# Patient Record
Sex: MALE | Race: WHITE | URBAN - METROPOLITAN AREA
[De-identification: names, ages, dates, MRNs, and addresses within clinical notes are randomized per-mention and may not be internally consistent; named-entity substitution may affect disease eponyms.]

---

## 2021-03-19 ENCOUNTER — OFFICE VISIT (OUTPATIENT)
Dept: URBAN - METROPOLITAN AREA CLINIC 29 | Facility: CLINIC | Age: 31
End: 2021-03-19
Payer: COMMERCIAL

## 2021-03-19 PROCEDURE — 92004 COMPRE OPH EXAM NEW PT 1/>: CPT | Performed by: OPTOMETRIST

## 2021-03-19 PROCEDURE — 92134 CPTRZ OPH DX IMG PST SGM RTA: CPT | Performed by: OPTOMETRIST

## 2021-03-19 ASSESSMENT — INTRAOCULAR PRESSURE
OD: 15
OS: 22

## 2021-03-19 NOTE — IMPRESSION/PLAN
Impression: Central retinal vein occls, right eye, with macular edema: H34.8110. Plan: Discussed diagnosis in detail with patient. Discussed treatment options with patient. Consult recommended [[facility] Discussed risks of progression. Call if [address] worsens. Advised patient to follow up with PCP for HTN treatment.

## 2021-03-19 NOTE — ASSESSMENT/PLAN
Impression: OCT MAC - OD: Good-; OS: Good- Plan: Macular edema OD. Normal macula appearance without signs of disease OS.

## 2021-03-22 ENCOUNTER — OFFICE VISIT (OUTPATIENT)
Dept: URBAN - METROPOLITAN AREA CLINIC 13 | Facility: CLINIC | Age: 31
End: 2021-03-22
Payer: COMMERCIAL

## 2021-03-22 PROCEDURE — 99204 OFFICE O/P NEW MOD 45 MIN: CPT | Performed by: OPHTHALMOLOGY

## 2021-03-22 PROCEDURE — 92134 CPTRZ OPH DX IMG PST SGM RTA: CPT | Performed by: OPHTHALMOLOGY

## 2021-03-22 ASSESSMENT — INTRAOCULAR PRESSURE
OS: 15
OD: 12

## 2021-03-22 NOTE — IMPRESSION/PLAN
Impression: Central retinal vein occlusion of right eye w/ macular edema: H34.8110. OCT OU = IRF OD, no SRF/IRF OS  / 29 Plan: 32year old - had high BP when seen in ED on 3/17 -- now on better control The patient has a central retinal vein occlusion (CRVO) with significant macular edema. There is no iris or angle neovascularization identified on examination. We discussed the natural history and the importance of a cardiovascular evaluation with a primary care physician, including optimizing blood pressure. We discussed treatment options, including intravitreal steroid injection, anti-VEGF injection, or participation in one of our ongoing clinical trials. 

1 week Optos FA/Avastin OD  - if not clinical trial participant

## 2021-03-29 ENCOUNTER — OFFICE VISIT (OUTPATIENT)
Dept: URBAN - METROPOLITAN AREA CLINIC 13 | Facility: CLINIC | Age: 31
End: 2021-03-29
Payer: COMMERCIAL

## 2021-03-29 PROCEDURE — 92235 FLUORESCEIN ANGRPH MLTIFRAME: CPT | Performed by: OPHTHALMOLOGY

## 2021-03-29 ASSESSMENT — INTRAOCULAR PRESSURE
OS: 19
OD: 18

## 2021-04-09 ENCOUNTER — Encounter (OUTPATIENT)
Dept: URBAN - METROPOLITAN AREA CLINIC 13 | Facility: CLINIC | Age: 31
End: 2021-04-09
Payer: COMMERCIAL

## 2021-04-09 PROCEDURE — 67028 INJECTION EYE DRUG: CPT | Performed by: OPHTHALMOLOGY

## 2021-04-09 ASSESSMENT — INTRAOCULAR PRESSURE
OS: 16
OD: 15
OD: 15
OS: 16

## 2021-05-10 ENCOUNTER — OFFICE VISIT (OUTPATIENT)
Dept: URBAN - METROPOLITAN AREA CLINIC 13 | Facility: CLINIC | Age: 31
End: 2021-05-10
Payer: COMMERCIAL

## 2021-05-10 PROCEDURE — 67028 INJECTION EYE DRUG: CPT | Performed by: OPHTHALMOLOGY

## 2021-05-10 PROCEDURE — 99214 OFFICE O/P EST MOD 30 MIN: CPT | Performed by: OPHTHALMOLOGY

## 2021-05-10 PROCEDURE — 92134 CPTRZ OPH DX IMG PST SGM RTA: CPT | Performed by: OPHTHALMOLOGY

## 2021-05-10 ASSESSMENT — INTRAOCULAR PRESSURE
OD: 20
OS: 19

## 2021-05-10 NOTE — IMPRESSION/PLAN
Impression: Central retinal vein occlusion of right eye w/ macular edema: H34.8110. OCT OU = IRF OD, no SRF/IRF OS  / 282
s/p Avastin OD - 04/09/2021 Plan: Improved post Avastin. Still has edema. Rec Avastin as monthly series. Discussed R,B,A of Avastin vs Lucentis vs Eylea vs Beovu injection. Discussed no FDA approval with Avastin and compounding risk. Discussed signs and symptoms of inflammation, endophthalmitis, vitreous hemorrhage, retinal tear, retinal detachment. Patient understands and wishes to proceed with Avastin injection today. Timeout was performed before procedure. AVASTIN INJECTION COMPLETED TODAY as per protocol without complications. 

1 month OCT OU Avastin OD 2/3

## 2021-06-07 ENCOUNTER — PROCEDURE (OUTPATIENT)
Dept: URBAN - METROPOLITAN AREA CLINIC 13 | Facility: CLINIC | Age: 31
End: 2021-06-07
Payer: COMMERCIAL

## 2021-06-07 PROCEDURE — 67028 INJECTION EYE DRUG: CPT | Performed by: OPHTHALMOLOGY

## 2021-06-07 PROCEDURE — 92134 CPTRZ OPH DX IMG PST SGM RTA: CPT | Performed by: OPHTHALMOLOGY

## 2021-06-07 ASSESSMENT — INTRAOCULAR PRESSURE
OD: 14
OS: 14

## 2021-07-12 ENCOUNTER — PROCEDURE (OUTPATIENT)
Dept: URBAN - METROPOLITAN AREA CLINIC 7 | Facility: CLINIC | Age: 31
End: 2021-07-12
Payer: COMMERCIAL

## 2021-07-12 PROCEDURE — 92134 CPTRZ OPH DX IMG PST SGM RTA: CPT | Performed by: OPHTHALMOLOGY

## 2021-07-12 PROCEDURE — 67028 INJECTION EYE DRUG: CPT | Performed by: OPHTHALMOLOGY

## 2021-07-12 ASSESSMENT — INTRAOCULAR PRESSURE
OD: 17
OS: 20

## 2021-08-23 ENCOUNTER — OFFICE VISIT (OUTPATIENT)
Dept: URBAN - METROPOLITAN AREA CLINIC 7 | Facility: CLINIC | Age: 31
End: 2021-08-23
Payer: COMMERCIAL

## 2021-08-23 DIAGNOSIS — H34.8110 CENTRAL RETINAL VEIN OCCLUSION OF RIGHT EYE W/ MACULAR EDEMA: Primary | ICD-10-CM

## 2021-08-23 PROCEDURE — 99214 OFFICE O/P EST MOD 30 MIN: CPT | Performed by: OPHTHALMOLOGY

## 2021-08-23 PROCEDURE — 67028 INJECTION EYE DRUG: CPT | Performed by: OPHTHALMOLOGY

## 2021-08-23 PROCEDURE — 92134 CPTRZ OPH DX IMG PST SGM RTA: CPT | Performed by: OPHTHALMOLOGY

## 2021-08-23 ASSESSMENT — INTRAOCULAR PRESSURE
OS: 16
OD: 17

## 2021-08-23 NOTE — IMPRESSION/PLAN
Impression: Central retinal vein occlusion of right eye w/ macular edema: H34.8110. OCT OU = increased IRF OD, no SRF/IRF OS  / 273
s/p Avastin OD - 07/12/2021 Plan: Improved post Avastin at 4 weeks, a bit worse at 6 weeks. Rec Avastin as monthly series at 4-5 weeks. Discussed R,B,A of Avastin vs Lucentis vs Eylea vs Beovu injection. Discussed no FDA approval with Avastin and compounding risk. Discussed signs and symptoms of inflammation, endophthalmitis, vitreous hemorrhage, retinal tear, retinal detachment. Patient understands and wishes to proceed with Avastin injection today. Timeout was performed before procedure. AVASTIN INJECTION COMPLETED TODAY as per protocol without complications. 

4-5wks OCT OU Avastin OD 2/3

## 2021-09-20 ENCOUNTER — PROCEDURE (OUTPATIENT)
Dept: URBAN - METROPOLITAN AREA CLINIC 7 | Facility: CLINIC | Age: 31
End: 2021-09-20
Payer: COMMERCIAL

## 2021-09-20 PROCEDURE — 92134 CPTRZ OPH DX IMG PST SGM RTA: CPT | Performed by: OPHTHALMOLOGY

## 2021-09-20 PROCEDURE — 67028 INJECTION EYE DRUG: CPT | Performed by: OPHTHALMOLOGY

## 2021-09-20 ASSESSMENT — INTRAOCULAR PRESSURE
OD: 14
OS: 12

## 2021-10-18 ENCOUNTER — PROCEDURE (OUTPATIENT)
Dept: URBAN - METROPOLITAN AREA CLINIC 7 | Facility: CLINIC | Age: 31
End: 2021-10-18
Payer: COMMERCIAL

## 2021-10-18 PROCEDURE — 92134 CPTRZ OPH DX IMG PST SGM RTA: CPT | Performed by: OPHTHALMOLOGY

## 2021-10-18 PROCEDURE — 67028 INJECTION EYE DRUG: CPT | Performed by: OPHTHALMOLOGY

## 2021-10-18 ASSESSMENT — INTRAOCULAR PRESSURE
OS: 17
OD: 17

## 2021-11-16 ENCOUNTER — OFFICE VISIT (OUTPATIENT)
Dept: URBAN - METROPOLITAN AREA CLINIC 13 | Facility: CLINIC | Age: 31
End: 2021-11-16
Payer: COMMERCIAL

## 2021-11-16 PROCEDURE — 92134 CPTRZ OPH DX IMG PST SGM RTA: CPT | Performed by: OPHTHALMOLOGY

## 2021-11-16 PROCEDURE — 99214 OFFICE O/P EST MOD 30 MIN: CPT | Performed by: OPHTHALMOLOGY

## 2021-11-16 PROCEDURE — 67028 INJECTION EYE DRUG: CPT | Performed by: OPHTHALMOLOGY

## 2021-11-16 ASSESSMENT — INTRAOCULAR PRESSURE
OD: 19
OS: 18

## 2021-11-16 NOTE — IMPRESSION/PLAN
Impression: Central retinal vein occlusion of right eye w/ macular edema: H34.8110. OCT OU = mild IRF OD, no SRF/IRF OS  / 283 
s/p Avastin OD - 10/18/2021 Plan: Improved post Avastin at 4 weeks, a bit worse at 6 weeks. Rec Avastin today. May consider switch to other anti-VEGF in hopes of longer duration of drying. Would hold off on steroid for now to avoid cataract formation, given his young age. D/w patient, who agrees = switch to [facility] at next visit. Discussed R,B,A of Avastin vs Lucentis vs Eylea vs Beovu injection. Discussed no FDA approval with Avastin and compounding risk. Discussed signs and symptoms of inflammation, endophthalmitis, vitreous hemorrhage, retinal tear, retinal detachment. Patient understands and wishes to proceed with Avastin injection today. Timeout was performed before procedure. AVASTIN INJECTION COMPLETED TODAY as per protocol without complications. 

4-5wks OCT OU Eylea OD 2/3

## 2021-12-20 ENCOUNTER — OFFICE VISIT (OUTPATIENT)
Dept: URBAN - METROPOLITAN AREA CLINIC 13 | Facility: CLINIC | Age: 31
End: 2021-12-20
Payer: COMMERCIAL

## 2021-12-20 PROCEDURE — 92134 CPTRZ OPH DX IMG PST SGM RTA: CPT | Performed by: OPHTHALMOLOGY

## 2021-12-20 PROCEDURE — 67028 INJECTION EYE DRUG: CPT | Performed by: OPHTHALMOLOGY

## 2021-12-20 ASSESSMENT — INTRAOCULAR PRESSURE
OS: 18
OD: 18

## 2022-01-24 ENCOUNTER — PROCEDURE (OUTPATIENT)
Dept: URBAN - METROPOLITAN AREA CLINIC 7 | Facility: CLINIC | Age: 32
End: 2022-01-24
Payer: COMMERCIAL

## 2022-01-24 PROCEDURE — 67028 INJECTION EYE DRUG: CPT | Performed by: OPHTHALMOLOGY

## 2022-01-24 PROCEDURE — 92134 CPTRZ OPH DX IMG PST SGM RTA: CPT | Performed by: OPHTHALMOLOGY

## 2022-03-07 ENCOUNTER — OFFICE VISIT (OUTPATIENT)
Dept: URBAN - METROPOLITAN AREA CLINIC 7 | Facility: CLINIC | Age: 32
End: 2022-03-07
Payer: COMMERCIAL

## 2022-03-07 PROCEDURE — 92134 CPTRZ OPH DX IMG PST SGM RTA: CPT | Performed by: OPHTHALMOLOGY

## 2022-03-07 PROCEDURE — 99214 OFFICE O/P EST MOD 30 MIN: CPT | Performed by: OPHTHALMOLOGY

## 2022-03-07 PROCEDURE — 67028 INJECTION EYE DRUG: CPT | Performed by: OPHTHALMOLOGY

## 2022-03-07 ASSESSMENT — INTRAOCULAR PRESSURE
OD: 16
OS: 17

## 2022-03-07 NOTE — IMPRESSION/PLAN
Impression: Central retinal vein occlusion of right eye w/ macular edema: H34.8110. OCT OU = mild IRF OD, no SRF/IRF OS  / 281
s/p Avastin OD - 10/18/2021
s/p EYL OD 1/24/22 Plan: Improved post Avastin at 4 weeks, a bit worse at 6 weeks. Today, stable 6 weeks post Eylea. Rec T&E Discussed R,B,A of Avastin vs Lucentis vs Eylea vs Beovu injection. Discussed no FDA approval with Avastin and compounding risk. Discussed signs and symptoms of inflammation, endophthalmitis, vitreous hemorrhage, retinal tear, retinal detachment. Patient understands and wishes to proceed with Avastin injection today. Timeout was performed before procedure. AVASTIN INJECTION COMPLETED TODAY as per protocol without complications. 

7-8wks OCT OU re-eval Eylea OD

## 2022-05-10 ENCOUNTER — OFFICE VISIT (OUTPATIENT)
Dept: URBAN - METROPOLITAN AREA CLINIC 13 | Facility: CLINIC | Age: 32
End: 2022-05-10
Payer: COMMERCIAL

## 2022-05-10 DIAGNOSIS — H34.8110 CENTRAL RETINAL VEIN OCCLUSION, RIGHT EYE, WITH MACULAR EDEMA: Primary | ICD-10-CM

## 2022-05-10 PROCEDURE — 92134 CPTRZ OPH DX IMG PST SGM RTA: CPT | Performed by: OPHTHALMOLOGY

## 2022-05-10 PROCEDURE — 67028 INJECTION EYE DRUG: CPT | Performed by: OPHTHALMOLOGY

## 2022-05-10 ASSESSMENT — INTRAOCULAR PRESSURE
OS: 16
OD: 17

## 2022-05-10 NOTE — IMPRESSION/PLAN
Impression: Central retinal vein occlusion of right eye w/ macular edema: H34.8110. OCT OU = no IRF OD, no SRF/IRF OS
s/p Avastin OD - 10/18/2021
s/p EYL OD 03/07/22 Plan: Improved post Avastin at 4 weeks, a bit worse at 6 weeks. Today, stable 8 weeks post Eylea. Rec T&E Discussed R,B,A of Avastin vs Lucentis vs Eylea vs Beovu injection. Discussed no FDA approval with Avastin and compounding risk. Discussed signs and symptoms of inflammation, endophthalmitis, vitreous hemorrhage, retinal tear, retinal detachment. Patient understands and wishes to proceed with Avastin injection today. Timeout was performed before procedure. AVASTIN INJECTION COMPLETED TODAY as per protocol without complications. 

6-8wks OCT OU re-eval Eylea OD

## 2022-07-25 ENCOUNTER — OFFICE VISIT (OUTPATIENT)
Dept: URBAN - METROPOLITAN AREA CLINIC 7 | Facility: CLINIC | Age: 32
End: 2022-07-25
Payer: COMMERCIAL

## 2022-07-25 DIAGNOSIS — H34.8110 CENTRAL RETINAL VEIN OCCLUSION, RIGHT EYE, WITH MACULAR EDEMA: Primary | ICD-10-CM

## 2022-07-25 DIAGNOSIS — H35.371 PUCKERING OF MACULA, RIGHT EYE: ICD-10-CM

## 2022-07-25 PROCEDURE — 67028 INJECTION EYE DRUG: CPT | Performed by: OPHTHALMOLOGY

## 2022-07-25 PROCEDURE — 92134 CPTRZ OPH DX IMG PST SGM RTA: CPT | Performed by: OPHTHALMOLOGY

## 2022-07-25 ASSESSMENT — INTRAOCULAR PRESSURE
OD: 25
OS: 24

## 2022-07-25 NOTE — IMPRESSION/PLAN
Impression: Central retinal vein occlusion of right eye w/ macular edema: H34.8110. OCT OU = no IRF OD, no SRF/IRF OS  / 282
s/p Avastin OD - 10/18/2021
s/p Eylea OD - 05/10/2022 Plan: Improved post Avastin at 4 weeks, a bit worse at 6 weeks. Today, stable 11 weeks post Eylea. Rec T&E. Patient prefers tighter interval.  Will do series at 2m Discussed R,B,A of Avastin vs Lucentis vs Eylea vs Beovu vs Vabysmo injection. Discussed signs and symptoms of inflammation, endophthalmitis, vitreous hemorrhage, retinal tear, retinal detachment. Patient understands and wishes to proceed with Eylea injection today. Timeout was performed before procedure. After 2% Subconjunctival anesthesia & betadine prep, Eylea  was injected with no complications. Overfill discarded. 

2m OCT OU/Eylea OD #2/3

## 2022-09-19 ENCOUNTER — PROCEDURE (OUTPATIENT)
Dept: URBAN - METROPOLITAN AREA CLINIC 7 | Facility: CLINIC | Age: 32
End: 2022-09-19
Payer: COMMERCIAL

## 2022-09-19 DIAGNOSIS — H34.8110 CENTRAL RETINAL VEIN OCCLUSION, RIGHT EYE, WITH MACULAR EDEMA: Primary | ICD-10-CM

## 2022-09-19 PROCEDURE — 67028 INJECTION EYE DRUG: CPT | Performed by: OPHTHALMOLOGY

## 2022-09-19 PROCEDURE — 92134 CPTRZ OPH DX IMG PST SGM RTA: CPT | Performed by: OPHTHALMOLOGY

## 2022-09-19 ASSESSMENT — INTRAOCULAR PRESSURE
OS: 19
OD: 20

## 2023-01-04 ENCOUNTER — OFFICE VISIT (OUTPATIENT)
Dept: URBAN - METROPOLITAN AREA CLINIC 13 | Facility: CLINIC | Age: 33
End: 2023-01-04
Payer: COMMERCIAL

## 2023-01-04 DIAGNOSIS — H34.8110 CENTRAL RETINAL VEIN OCCLUSION OF RIGHT EYE W/ MACULAR EDEMA: Primary | ICD-10-CM

## 2023-01-04 DIAGNOSIS — H43.811 VITREOUS DEGENERATION, RIGHT EYE: ICD-10-CM

## 2023-01-04 DIAGNOSIS — H35.371 PUCKERING OF MACULA, RIGHT EYE: ICD-10-CM

## 2023-01-04 PROCEDURE — 67028 INJECTION EYE DRUG: CPT | Performed by: OPHTHALMOLOGY

## 2023-01-04 PROCEDURE — 99214 OFFICE O/P EST MOD 30 MIN: CPT | Performed by: OPHTHALMOLOGY

## 2023-01-04 PROCEDURE — 92134 CPTRZ OPH DX IMG PST SGM RTA: CPT | Performed by: OPHTHALMOLOGY

## 2023-01-04 ASSESSMENT — INTRAOCULAR PRESSURE
OS: 20
OD: 30

## 2023-01-04 NOTE — IMPRESSION/PLAN
Impression: Central retinal vein occlusion of right eye w/ macular edema: H34.8110. OCT OU = no IRF OD, no SRF/IRF OS /268
s/p Avastin OD - 10/18/2021
s/p Eylea OD - 09/19/2022 Plan: Improved post Avastin at 4 weeks, a bit worse at 6 weeks. Today, stable 15 weeks post Eylea. RBA's of obs vs treat @3m maintenance. D/w patient in detail that he may be able to d/c (prn). Patient elects q3m maintenance Discussed R,B,A of Avastin vs Lucentis vs Eylea vs Beovu vs Vabysmo injection. Discussed signs and symptoms of inflammation, endophthalmitis, vitreous hemorrhage, retinal tear, retinal detachment. Patient understands and wishes to proceed with Eylea injection today. Timeout was performed before procedure. After 2% Subconjunctival anesthesia & betadine prep, Eylea  was injected with no complications. Overfill discarded. 

3m OCT OU Eylea OD (straight)
then 3m OCT OU re eval Eylea OD

## 2023-04-05 ENCOUNTER — PROCEDURE (OUTPATIENT)
Dept: URBAN - METROPOLITAN AREA CLINIC 13 | Facility: CLINIC | Age: 33
End: 2023-04-05
Payer: COMMERCIAL

## 2023-04-05 DIAGNOSIS — H34.8110 CENTRAL RETINAL VEIN OCCLUSION, RIGHT EYE, WITH MACULAR EDEMA: Primary | ICD-10-CM

## 2023-04-05 PROCEDURE — 67028 INJECTION EYE DRUG: CPT | Performed by: OPHTHALMOLOGY

## 2023-04-05 PROCEDURE — 92134 CPTRZ OPH DX IMG PST SGM RTA: CPT | Performed by: OPHTHALMOLOGY

## 2023-04-05 ASSESSMENT — INTRAOCULAR PRESSURE
OD: 16
OS: 23

## 2023-07-12 ENCOUNTER — OFFICE VISIT (OUTPATIENT)
Dept: URBAN - METROPOLITAN AREA CLINIC 13 | Facility: CLINIC | Age: 33
End: 2023-07-12
Payer: COMMERCIAL

## 2023-07-12 DIAGNOSIS — H43.811 VITREOUS DEGENERATION, RIGHT EYE: ICD-10-CM

## 2023-07-12 DIAGNOSIS — H34.8110 CENTRAL RETINAL VEIN OCCLUSION OF RIGHT EYE W/ MACULAR EDEMA: Primary | ICD-10-CM

## 2023-07-12 DIAGNOSIS — H35.371 PUCKERING OF MACULA, RIGHT EYE: ICD-10-CM

## 2023-07-12 PROCEDURE — 99213 OFFICE O/P EST LOW 20 MIN: CPT | Performed by: OPHTHALMOLOGY

## 2023-07-12 PROCEDURE — 67028 INJECTION EYE DRUG: CPT | Performed by: OPHTHALMOLOGY

## 2023-07-12 PROCEDURE — 92134 CPTRZ OPH DX IMG PST SGM RTA: CPT | Performed by: OPHTHALMOLOGY

## 2023-07-12 ASSESSMENT — INTRAOCULAR PRESSURE
OD: 21
OS: 17

## 2023-07-12 NOTE — IMPRESSION/PLAN
Impression: Central retinal vein occlusion of right eye w/ macular edema: H34.8110. OCT OU = no IRF, but mild nasal ERM OD, no SRF/IRF OS  / 276 
s/p Avastin OD - 10/18/2021
s/p Eylea OD - 04/05/2023 Plan: Improved post Avastin at 4 weeks, a bit worse at 6 weeks. Stable 14/15 weeks post Eylea, including today. RBA's of obs vs treat @3m maintenance. D/w patient in detail that he may be able to d/c (prn). Patient elects q3m maintenance Discussed R,B,A of Avastin vs Lucentis vs Eylea vs Beovu vs Vabysmo injection. Discussed signs and symptoms of inflammation, endophthalmitis, vitreous hemorrhage, retinal tear, retinal detachment. Patient understands and wishes to proceed with Eylea injection today. Timeout was performed before procedure. After 2% Subconjunctival anesthesia & betadine prep, Eylea  was injected with no complications. Overfill discarded. 

3m OCT OU Eylea OD (straight)
then 3m OCT OU re eval Eylea OD